# Patient Record
Sex: MALE | Race: WHITE | NOT HISPANIC OR LATINO
[De-identification: names, ages, dates, MRNs, and addresses within clinical notes are randomized per-mention and may not be internally consistent; named-entity substitution may affect disease eponyms.]

---

## 2021-05-16 ENCOUNTER — APPOINTMENT (OUTPATIENT)
Dept: DISASTER EMERGENCY | Facility: CLINIC | Age: 66
End: 2021-05-16

## 2021-05-16 DIAGNOSIS — Z01.818 ENCOUNTER FOR OTHER PREPROCEDURAL EXAMINATION: ICD-10-CM

## 2021-05-17 LAB — SARS-COV-2 N GENE NPH QL NAA+PROBE: NOT DETECTED

## 2021-10-25 ENCOUNTER — APPOINTMENT (OUTPATIENT)
Dept: DISASTER EMERGENCY | Facility: CLINIC | Age: 66
End: 2021-10-25

## 2021-10-26 LAB — SARS-COV-2 N GENE NPH QL NAA+PROBE: NOT DETECTED

## 2022-05-24 ENCOUNTER — APPOINTMENT (OUTPATIENT)
Dept: PAIN MANAGEMENT | Facility: CLINIC | Age: 67
End: 2022-05-24
Payer: COMMERCIAL

## 2022-05-24 VITALS — HEIGHT: 74 IN | WEIGHT: 235 LBS | BODY MASS INDEX: 30.16 KG/M2

## 2022-05-24 PROCEDURE — 99214 OFFICE O/P EST MOD 30 MIN: CPT

## 2022-05-25 NOTE — PHYSICAL EXAM
[de-identified] : Constitutional: \par - No acute distress \par - Well developed; well nourished \par \par Neurological: \par - normal mood and affect \par - alert and oriented x 3  \par \par Cardiovascular: \par - grossly normal\par \par Lumbar Spine Exam: \par \par Inspection: \par erythema (-) \par ecchymosis (-) \par rashes (-) \par alignment: no scoliosis\par \par Palpation:  \par paraspinal tenderness:                  L (-) ; R (-) \par thoracic paraspinal tenderness:    L (-) ; R (-) \par sciatic nerve tenderness :             L (-) ; R (-) \par SI joint tenderness:                        L (-) ; R (-) \par GTB tenderness:                            L (-);  R (-) \par \par ROM:   \par Full ROM with mild stiffness \par \par Strength:                   Right       Left    \par Hip Flexion:                (5/5)       (5/5) \par Quadriceps:               (5/5)       (5/5) \par Hamstrings:                (5/5)       (5/5) \par Ankle Dorsiflexion:     (5/5)       (5/5) \par EHL:                            (5/5)       (5/5) \par Ankle Plantarflexion:  (5/5)       (5/5) \par \par \par Special Tests: \par SLR:                      R (-) ; L (-) \par Facet loading:       R (-) ; L (-) \par DESTINEY test:          R (-) ; L (-) \par XSLR:                   R (-) ; L (-) \par Taj's test:        R (-) ; L(-) \par Tight Hamstrings   R (+) ; L (+) \par \par Neurologic: \par Light touch intact throughout LE \par Reflexes normal and symmetric\par \par Gait: \par non- antalgic gait

## 2022-05-25 NOTE — ASSESSMENT
[FreeTextEntry1] : A thorough discussion occurred regarding available pain management treatment options including interventional, rehabilitative, pharmacological, and alternative modalities with the patient. We will proceed with the following:\par \par Interventional treatment options:\par - Proceed with left L4-L5, L5-S1 TFESI with fluoroscopic guidance - may remain on aspirin \par - requires clearance to hold Brilinta for 5 days \par - see additional instructions below\par \par Rehabilitative options:\par - Remains uninterested in trail of PT \par - Home exercise sheet provided at previous visit \par \par Medication based treatment options:\par - Caution with NSAIDs given use of chronic anti-coagulation \par - Celebrex 200mg Daily PRN; Moderate to severe pain as per PCP \par - continue Tylenol 500-1000 mg TID PRN and flexeril PRN spasm \par - Can consider trial of anti-neuropathic medication with failure of interventional therapy\par - see additional instructions below\par \par Complementary treatment options:\par - Weight management and lifestyle modifications discussed\par - See additional instructions below\par \par Additional treatment recommendations as follows:\par - Previously Discussed orthopedic spine evaluation for spondylolisthesis/stenosis; patient remains uninterested\par - Follow up 1-2 weeks post injection for assessment of efficacy and further recommendations.\par \par The risks, benefits and alternatives of the proposed procedure were explained in detail with the patient. The risks outlined include but are not limited to infection, bleeding, post- dural puncture headache, nerve injury, a temporary increase in pain, failure to resolve symptoms, allergic reaction, and possible elevation of blood sugar in diabetics if using corticosteroid.  All questions were answered to patient's apparent satisfaction and he/she verbalized an understanding.\par \par We have discussed the risks, benefits, and alternatives NSAID therapy including but not limited to the risk of bleeding, thrombosis, gastric mucosal irritation/ulceration, allergic reaction and kidney dysfunction; the patient verbalizes an understanding.\par  \par The documentation recorded by the scribe, in my presence, accurately reflects the service I personally performed and the decisions made by me with my edits as appropriate.\par \par I, Terrell Doran acting as scribe, attest that this documentation has been prepared under the direction and in the presence of Provider Flaco Cerrato DO.

## 2022-05-25 NOTE — HISTORY OF PRESENT ILLNESS
[Lower back] : lower back [Sudden] : sudden [5] : 5 [Dull/Aching] : dull/aching [Intermittent] : intermittent [Leisure] : leisure [Meds] : meds [Injection therapy] : injection therapy [Walking] : walking [Bending forward] : bending forward [FreeTextEntry1] : 5/24/22 - Patient presents for a MRI imaging review. Patient reports increase of pain with prolonged standing. Patient c/o low back pain radiating to the left lower extremity. Patient reports back > LLE pain. Patient reports benefit with previous YUE's. Patient reports he started new employment which requires standing for prolonged period of time. May exacerbate his symptoms. \par \par 2/15/21 - Patient presents for a FUV. Patient c/o soreness in his lower back after increased activity. Does not c/o left leg pain since prior YUE. Patient reports that activity exacerbates his pain. \par \par 11/16/21 - Patient presents for a FUV following a repeat Left L4-5, L5-S1 TFESI on 10/29/21. Patient reports his leg symptoms have improved significantly. Patient reports that his lower back pain is present with activity. Patient denies any bowel/bladder incontinence. \par \par 10/5/21 - Patient presents for a FUV. Patient c/o left leg paresthesia's and intermittent lower back pain with bending. Patient still notices improvement in his pain form his last injection in June but has had a return of left leg pain. \par \par 7/6/21 - FUV after left L4-L5, L5-S1 TFESI on 6/25/21. He reports significant reduction of low back and left leg pain. Some residual numbness and burning in the left thigh, worse with activity. Overall 90% improved. Has stopped use of NSAIDS since injection given improvement of pain. Did not start trial of physical therapy. \par \par 6/8/2021 - FUV after right PM L5-S1 LESI on 5/21/2021. He reports no relief of pain even temporarily. He distributes his pain 50% low back and 50% left leg. Pain is worse with increased activity. \par \par 4/27/2021 - Patient presents for FUV regarding his low back and left leg pain. Was previously indicated for YUE however did not proceed given improvement of symptoms. States his pain has since returned. Pain is predominately across the low back, occasionally radiating down the left leg. Left leg worsened with strenuous activity whereas low back pain is more constant. Alleviated with bending forward. \par \par 7/10/2020 - Patient presents for initial evaluation. He c/o low back and left leg pain. Has had long standing low back pain with recent development of radiating left thigh pain with associated numbness. Pain is worse with standing/walking and activity, alleviated with sitting. Denies LE weakness, no b/b dysfunction. No gait abnormalities. No previous PT/chiropractic/acupuncture. Celebrex PRN and Flexeril PRN spasm. On Brillinta. \par \par Injections: \par 1) Right PM L5-S1 LESI (5/21/2021)\par 2) Left L4-L5, L5-S1 TFESI (6/25/21, 10/29/21)\par \par Pertinent Surgical History: N/A\par \par Imaging:\par MRI Lumbar Spine (03/19/22) - [facility]\par T12-L1: No evidence of disc bulge, protrusion or extrusion. No central canal, subarticular or neural foraminal narrowing is noted. There is no significant facet arthropathy.\par L1-L2: No evidence of disc bulge, protrusion or extrusion. No central canal, subarticular or neural foraminal narrowing is noted. There is no significant facet arthropathy.\par L2-L3: Disc bulge and mild bilateral facet arthrosis. Mild central canal narrowing. No significant change.\par L3-L4: Disc bulge and bilateral facet arthrosis with mild bilateral neural foraminal narrowing. Mild central canal narrowing.\par L4-5: Grade 1 anterolisthesis secondary to severe bilateral facet arthrosis with superimposed circumferential disc bulge with severe central canal narrowing and bilateral subarticular stenosis impinging on the descending L5 nerve roots and potentially additional nerve roots. New Central annular tear new compared to previous examination.\par L5-S1: Protruded Central disc herniation with right greater than left subarticular stenosis with encroachment possible impingement on the right descending S1 nerve root. \par \par Physician Disclaimer: I have personally reviewed and confirmed all HPI data with the patient. [] : no

## 2022-05-25 NOTE — REASON FOR VISIT
[Follow-Up Visit] : a follow-up pain management visit [FreeTextEntry2] : lower back pain and mri review

## 2022-06-02 ENCOUNTER — APPOINTMENT (OUTPATIENT)
Dept: ORTHOPEDIC SURGERY | Facility: CLINIC | Age: 67
End: 2022-06-02
Payer: COMMERCIAL

## 2022-06-02 VITALS — WEIGHT: 235 LBS | HEIGHT: 74 IN | BODY MASS INDEX: 30.16 KG/M2

## 2022-06-02 DIAGNOSIS — Z78.9 OTHER SPECIFIED HEALTH STATUS: ICD-10-CM

## 2022-06-02 DIAGNOSIS — Z86.39 PERSONAL HISTORY OF OTHER ENDOCRINE, NUTRITIONAL AND METABOLIC DISEASE: ICD-10-CM

## 2022-06-02 DIAGNOSIS — Z87.891 PERSONAL HISTORY OF NICOTINE DEPENDENCE: ICD-10-CM

## 2022-06-02 DIAGNOSIS — Z86.79 PERSONAL HISTORY OF OTHER DISEASES OF THE CIRCULATORY SYSTEM: ICD-10-CM

## 2022-06-02 PROCEDURE — 73110 X-RAY EXAM OF WRIST: CPT | Mod: LT

## 2022-06-02 PROCEDURE — 20605 DRAIN/INJ JOINT/BURSA W/O US: CPT

## 2022-06-02 PROCEDURE — 99203 OFFICE O/P NEW LOW 30 MIN: CPT | Mod: 25

## 2022-06-02 NOTE — PHYSICAL EXAM
[1st] : 1st [CMC Joint] : CMC joint [Right] : right elbow [Left] : left wrist [FreeTextEntry8] : stage 2 First CMC arthritis

## 2022-06-02 NOTE — PROCEDURE
[Medium Joint Injection] : medium joint injection [Left] : of the left [CMC Joint] : CMC joint [Pain] : pain [Inflammation] : inflammation [Alcohol] : alcohol [Ethyl Chloride sprayed topically] : ethyl chloride sprayed topically [Sterile technique used] : sterile technique used

## 2022-06-02 NOTE — HISTORY OF PRESENT ILLNESS
[Sudden] : sudden [9] : 9 [Radiating] : radiating [Sharp] : sharp [Household chores] : household chores [Leisure] : leisure [Work] : work [Sleep] : sleep [Meds] : meds [Full time] : Work status: full time [de-identified] : 66 year old male presents with radial and volar left hand and wrist pain for the past 3 weeks. No injury/trauma.  [] : no [FreeTextEntry1] : left hand [FreeTextEntry3] : 05/2022 [FreeTextEntry5] : No known cause of injury/ trauma  [FreeTextEntry9] : A [de-identified] : Pressure  [de-identified] : [place]

## 2022-06-11 ENCOUNTER — NON-APPOINTMENT (OUTPATIENT)
Age: 67
End: 2022-06-11

## 2022-06-15 ENCOUNTER — APPOINTMENT (OUTPATIENT)
Dept: PAIN MANAGEMENT | Facility: CLINIC | Age: 67
End: 2022-06-15
Payer: COMMERCIAL

## 2022-06-15 PROCEDURE — 64483 NJX AA&/STRD TFRM EPI L/S 1: CPT | Mod: LT

## 2022-06-15 PROCEDURE — 64484 NJX AA&/STRD TFRM EPI L/S EA: CPT | Mod: LT,59

## 2022-06-15 NOTE — PROCEDURE
[FreeTextEntry3] : Date of Service: 06/15/2022 \par \par Account: [id number]\par \par Patient: EMMANUEL GONZALEZ \par \par YOB: 1955\par \par Age: 66 year\par \par Surgeon:   Flaco Cerrato DO\par \par Assistant:  None\par \par Pre-Operative Diagnosis:   Lumbosacral Radiculitis (M54.17)\par \par Post Operative Diagnosis: Lumbosacral Radiculitis (M54.17)\par \par Procedure:             Left L4-L5, L5-S1 transforaminal epidural steroid injection under fluoroscopic guidance.\par \par Anesthesia:            MAC\par \par This procedure was carried out using fluoroscopic guidance.  The risks and benefits of the procedure were discussed extensively with the patient.  The consent of the patient was obtained and the following procedure was performed. The patient was placed in the prone position on the fluoroscopy table and the area was prepped and draped in a sterile fashion.  A timeout was performed with all essential staff present and the site and side were verified.\par \par The left L4-L5 neural foramen was then identified on right oblique "devon dog" anatomical view at the 6 o' clock position using fluoroscopic guidance, and the area was marked. The overlying skin and subcutaneous structures were anesthetized using sterile technique with 1% Lidocaine.   A 22 gauge 5 inch spinal needle was directed toward the inferior (6 o'clock) position of the pedicle, which formed the roof of the identified foramen.  Once in the epidural space, after negative aspiration for heme and CSF, 1cc of Omnipaque contrast was injected to confirm epidural location and assess filling defects and rule out intravascular needle placement.\par \par Lumbar epidurogram showed no intravascular or intrathecal flow pattern.  No blood or CSF was aspirated.  Omnipaque spread medially in epidural space and outlined the exiting nerve root.\par \par After this, 2.5 cc of a mixture of 3 cc of preservative free normal saline plus 80 mg of Kenalog was injected in the epidural space\par \par The left L5-S1 neural foramen and was then identified on right oblique "devon dog" anatomical view at the 6 o' clock position using fluoroscopic guidance, and the area was marked.  The overlying skin and subcutaneous structures were anesthetized using sterile technique with 1% Lidocaine.   A 22 gauge 5 inch spinal needle was directed toward the inferior (6 o'clock) position of the pedicle, which formed the roof of the identified foramen.  Once in the epidural space, after negative aspiration for heme and CSF, 1cc of Omnipaque contrast was injected to confirm epidural location and assess filling defects and rule out intravascular needle placement. \par \par Lumbar epidurogram showed no intravascular or intrathecal flow pattern.  No blood or CSF was aspirated.  Omnipaque spread medially in epidural space and outlined the exiting nerve root. \par \par After this, the remainder of the injectate listed above was injected in the epidural space.\par \par The needle was subsequently removed.  Vital signs remained normal.  Pulse oximeter was used throughout the procedure and the patient's pulse and oxygen saturation remained within normal limits.  The patient tolerated the procedure well.  There were no complications.  The patient was instructed to apply ice over the injection sites for twenty minutes every two hours for the next 24 to 48 hours.\par \par Disposition:\par      1. The patient was advised to F/U in 1-2 weeks to assess the response to the injection.\par      2. The patient was also instructed to contact me immediately if there were any concerns related to the procedure performed.

## 2022-07-05 ENCOUNTER — APPOINTMENT (OUTPATIENT)
Dept: PAIN MANAGEMENT | Facility: CLINIC | Age: 67
End: 2022-07-05

## 2022-07-05 VITALS — HEIGHT: 74 IN | WEIGHT: 235 LBS | BODY MASS INDEX: 30.16 KG/M2

## 2022-07-05 DIAGNOSIS — M54.16 RADICULOPATHY, LUMBAR REGION: ICD-10-CM

## 2022-07-05 DIAGNOSIS — M47.816 SPONDYLOSIS W/OUT MYELOPATHY OR RADICULOPATHY, LUMBAR REGION: ICD-10-CM

## 2022-07-05 DIAGNOSIS — M48.061 SPINAL STENOSIS, LUMBAR REGION WITHOUT NEUROGENIC CLAUDICATION: ICD-10-CM

## 2022-07-05 PROCEDURE — 99214 OFFICE O/P EST MOD 30 MIN: CPT

## 2022-07-06 NOTE — PHYSICAL EXAM
[de-identified] : Constitutional: \par - No acute distress \par - Well developed; well nourished \par \par Neurological: \par - normal mood and affect \par - alert and oriented x 3  \par \par Cardiovascular: \par - grossly normal\par \par Lumbar Spine Exam: \par \par Inspection: \par erythema (-) \par ecchymosis (-) \par rashes (-) \par alignment: no scoliosis\par \par Palpation:  \par paraspinal tenderness:                  L (-) ; R (-) \par thoracic paraspinal tenderness:    L (-) ; R (-) \par sciatic nerve tenderness :             L (-) ; R (-) \par SI joint tenderness:                        L (-) ; R (-) \par GTB tenderness:                            L (-);  R (-) \par \par ROM:   \par Full ROM with mild stiffness \par \par Strength:                   Right       Left    \par Hip Flexion:                (5/5)       (5/5) \par Quadriceps:               (5/5)       (5/5) \par Hamstrings:                (5/5)       (5/5) \par Ankle Dorsiflexion:     (5/5)       (5/5) \par EHL:                            (5/5)       (5/5) \par Ankle Plantarflexion:  (5/5)       (5/5)\par \par Special Tests: \par SLR:                      R (-) ; L (-) \par Facet loading:       R (-) ; L (-) \par DESTINEY test:          R (-) ; L (-)\par Tight Hamstrings   R (+) ; L (+) \par \par Neurologic: \par Light touch intact throughout LE \par Reflexes normal and symmetric\par \par Gait: \par mildly antalgic gait

## 2022-07-06 NOTE — HISTORY OF PRESENT ILLNESS
[Lower back] : lower back [5] : 5 [Dull/Aching] : dull/aching [Intermittent] : intermittent [Household chores] : household chores [Leisure] : leisure [Social interactions] : social interactions [Meds] : meds [Walking] : walking [Bending forward] : bending forward [Lying in bed] : lying in bed [FreeTextEntry1] : 7/5/22 - Patient presents for a FUV following a Left L4-5, L5-S1 TFESI on 06/15/2021. Patient reports a significant reduction in his lower extremity pain. Patient reports an overall 70% improvement in his pain. \par \par 5/24/22 - Patient presents for a MRI imaging review. Patient reports increase of pain with prolonged standing. Patient c/o low back pain radiating to the left lower extremity. Patient reports back > LLE pain. Patient reports benefit with previous UYE's. Patient reports he started new employment which requires standing for prolonged period of time. May exacerbate his symptoms. \par \par 2/15/21 - Patient presents for a FUV. Patient c/o soreness in his lower back after increased activity. Does not c/o left leg pain since prior YUE. Patient reports that activity exacerbates his pain. \par \par 11/16/21 - Patient presents for a FUV following a repeat Left L4-5, L5-S1 TFESI on 10/29/21. Patient reports his leg symptoms have improved significantly. Patient reports that his lower back pain is present with activity. Patient denies any bowel/bladder incontinence. \par \par 10/5/21 - Patient presents for a FUV. Patient c/o left leg paresthesia's and intermittent lower back pain with bending. Patient still notices improvement in his pain form his last injection in June but has had a return of left leg pain. \par \par 7/6/21 - FUV after left L4-L5, L5-S1 TFESI on 6/25/21. He reports significant reduction of low back and left leg pain. Some residual numbness and burning in the left thigh, worse with activity. Overall 90% improved. Has stopped use of NSAIDS since injection given improvement of pain. Did not start trial of physical therapy. \par \par 6/8/2021 - FUV after right PM L5-S1 LESI on 5/21/2021. He reports no relief of pain even temporarily. He distributes his pain 50% low back and 50% left leg. Pain is worse with increased activity. \par \par 4/27/2021 - Patient presents for FUV regarding his low back and left leg pain. Was previously indicated for YUE however did not proceed given improvement of symptoms. States his pain has since returned. Pain is predominately across the low back, occasionally radiating down the left leg. Left leg worsened with strenuous activity whereas low back pain is more constant. Alleviated with bending forward. \par \par 7/10/2020 - Patient presents for initial evaluation. He c/o low back and left leg pain. Has had long standing low back pain with recent development of radiating left thigh pain with associated numbness. Pain is worse with standing/walking and activity, alleviated with sitting. Denies LE weakness, no b/b dysfunction. No gait abnormalities. No previous PT/chiropractic/acupuncture. Celebrex PRN and Flexeril PRN spasm. On Brillinta. \par \par Injections: \par 1) Right PM L5-S1 LESI (5/21/2021)\par 2) Left L4-L5, L5-S1 TFESI (6/25/21, 10/29/21)\par \par Pertinent Surgical History: N/A\par \par Imaging:\par MRI Lumbar Spine (03/19/22) - [facility]\par \par T12-L1: No evidence of disc bulge, protrusion or extrusion. No central canal, subarticular or neural foraminal narrowing is noted. There is no significant facet arthropathy.\par L1-L2: No evidence of disc bulge, protrusion or extrusion. No central canal, subarticular or neural foraminal narrowing is noted. There is no significant facet arthropathy.\par L2-L3: Disc bulge and mild bilateral facet arthrosis. Mild central canal narrowing. No significant change.\par L3-L4: Disc bulge and bilateral facet arthrosis with mild bilateral neural foraminal narrowing. Mild central canal narrowing.\par L4-5: Grade 1 anterolisthesis secondary to severe bilateral facet arthrosis with superimposed circumferential disc bulge with severe central canal narrowing and bilateral subarticular stenosis impinging on the descending L5 nerve roots and potentially additional nerve roots. New Central annular tear new compared to previous examination.\par L5-S1: Protruded Central disc herniation with right greater than left subarticular stenosis with encroachment possible impingement on the right descending S1 nerve root. \par \par Physician Disclaimer: I have personally reviewed and confirmed all HPI data with the patient.  [] : no

## 2022-07-06 NOTE — ASSESSMENT
[FreeTextEntry1] : A thorough discussion occurred regarding available pain management treatment options including interventional, rehabilitative, pharmacological, and alternative modalities with the patient. We will proceed with the following:\par \par Interventional treatment options:\par - Can repeat left L4-L5, L5-S1 TFESI with return of severe radicular pain; may remain on aspirin\par - requires clearance to hold Brilinta for 5 days \par - see additional instructions below\par \par Rehabilitative options:\par - Remains uninterested in trial of PT \par - Home exercise sheet provided at previous visit \par \par Medication based treatment options:\par - Caution with NSAIDs given use of chronic anti-coagulation \par - Celebrex 200mg Daily PRN; Moderate to severe pain as per PCP \par - Continue Tylenol 500-1000 mg TID PRN \par - Can consider trial of anti-neuropathic medication with failure of interventional therapy\par - see additional instructions below\par \par Complementary treatment options:\par - Weight management and lifestyle modifications discussed\par \par Additional treatment recommendations as follows:\par - Previously discussed orthopedic spine evaluation for spondylolisthesis/stenosis; patient remains uninterested\par - Follow up for repeat injection or PRN basis \par \par The risks, benefits and alternatives of the proposed procedure were explained in detail with the patient. The risks outlined include but are not limited to infection, bleeding, post- dural puncture headache, nerve injury, a temporary increase in pain, failure to resolve symptoms, allergic reaction, and possible elevation of blood sugar in diabetics if using corticosteroid. All questions were answered to patient's apparent satisfaction and he/she verbalized an understanding.\par \par We have discussed the risks, benefits, and alternatives NSAID therapy including but not limited to the risk of bleeding, thrombosis, gastric mucosal irritation/ulceration, allergic reaction and kidney dysfunction; the patient verbalizes an understanding.\par  \par The documentation recorded by the scribe, in my presence, accurately reflects the service I personally performed and the decisions made by me with my edits as appropriate.

## 2022-09-12 ENCOUNTER — APPOINTMENT (OUTPATIENT)
Dept: ORTHOPEDIC SURGERY | Facility: CLINIC | Age: 67
End: 2022-09-12

## 2022-09-12 VITALS — HEIGHT: 74 IN | WEIGHT: 220 LBS | BODY MASS INDEX: 28.23 KG/M2

## 2022-09-12 PROCEDURE — 99213 OFFICE O/P EST LOW 20 MIN: CPT | Mod: 25,25

## 2022-09-12 PROCEDURE — 20605 DRAIN/INJ JOINT/BURSA W/O US: CPT

## 2022-09-12 NOTE — HISTORY OF PRESENT ILLNESS
[7] : 7 [0] : 0 [Full time] : Work status: full time [de-identified] : 66 year old male followed for LEFT first CMC arthritis. 3 months s/p CSI. Having reoccurrence.  [] : no

## 2023-10-06 ENCOUNTER — APPOINTMENT (OUTPATIENT)
Dept: ORTHOPEDIC SURGERY | Facility: CLINIC | Age: 68
End: 2023-10-06
Payer: MEDICARE

## 2023-10-06 VITALS — HEIGHT: 74 IN | WEIGHT: 220 LBS | BODY MASS INDEX: 28.23 KG/M2

## 2023-10-06 DIAGNOSIS — Z00.00 ENCOUNTER FOR GENERAL ADULT MEDICAL EXAMINATION W/OUT ABNORMAL FINDINGS: ICD-10-CM

## 2023-10-06 DIAGNOSIS — M17.11 UNILATERAL PRIMARY OSTEOARTHRITIS, RIGHT KNEE: ICD-10-CM

## 2023-10-06 DIAGNOSIS — M19.011 PRIMARY OSTEOARTHRITIS, RIGHT SHOULDER: ICD-10-CM

## 2023-10-06 DIAGNOSIS — M70.41 PREPATELLAR BURSITIS, RIGHT KNEE: ICD-10-CM

## 2023-10-06 DIAGNOSIS — M17.12 UNILATERAL PRIMARY OSTEOARTHRITIS, LEFT KNEE: ICD-10-CM

## 2023-10-06 DIAGNOSIS — Z78.9 OTHER SPECIFIED HEALTH STATUS: ICD-10-CM

## 2023-10-06 PROCEDURE — 99213 OFFICE O/P EST LOW 20 MIN: CPT | Mod: 25

## 2023-10-06 PROCEDURE — 20611 DRAIN/INJ JOINT/BURSA W/US: CPT | Mod: 50

## 2023-10-06 PROCEDURE — 73564 X-RAY EXAM KNEE 4 OR MORE: CPT | Mod: 50

## 2023-10-06 PROCEDURE — 73030 X-RAY EXAM OF SHOULDER: CPT | Mod: RT

## 2023-10-06 RX ORDER — LOSARTAN POTASSIUM 100 MG/1
100 TABLET, FILM COATED ORAL
Refills: 0 | Status: ACTIVE | COMMUNITY

## 2023-10-06 RX ORDER — EVOLOCUMAB 420 MG/3.5
420 KIT SUBCUTANEOUS
Refills: 0 | Status: ACTIVE | COMMUNITY

## 2023-10-06 RX ORDER — TICAGRELOR 60 MG/1
60 TABLET ORAL
Refills: 0 | Status: ACTIVE | COMMUNITY

## 2023-10-06 RX ORDER — OMEPRAZOLE 20 MG/1
20 TABLET, DELAYED RELEASE ORAL
Refills: 0 | Status: ACTIVE | COMMUNITY

## 2023-10-06 RX ORDER — SIMVASTATIN 20 MG/1
20 TABLET, FILM COATED ORAL
Refills: 0 | Status: ACTIVE | COMMUNITY

## 2023-10-06 RX ORDER — NEBIVOLOL HYDROCHLORIDE 5 MG/1
5 TABLET ORAL
Refills: 0 | Status: ACTIVE | COMMUNITY

## 2023-10-06 RX ORDER — AMLODIPINE BESYLATE 5 MG/1
5 TABLET ORAL
Refills: 0 | Status: ACTIVE | COMMUNITY

## 2023-10-06 RX ORDER — CELECOXIB 200 MG/1
200 CAPSULE ORAL
Refills: 0 | Status: ACTIVE | COMMUNITY

## 2023-10-16 ENCOUNTER — APPOINTMENT (OUTPATIENT)
Dept: ORTHOPEDIC SURGERY | Facility: CLINIC | Age: 68
End: 2023-10-16
Payer: MEDICARE

## 2023-10-16 VITALS — BODY MASS INDEX: 28.23 KG/M2 | WEIGHT: 220 LBS | HEIGHT: 74 IN

## 2023-10-16 DIAGNOSIS — M18.12 UNILATERAL PRIMARY OSTEOARTHRITIS OF FIRST CARPOMETACARPAL JOINT, LEFT HAND: ICD-10-CM

## 2023-10-16 PROCEDURE — 20605 DRAIN/INJ JOINT/BURSA W/O US: CPT | Mod: LT

## 2023-10-16 PROCEDURE — 99213 OFFICE O/P EST LOW 20 MIN: CPT | Mod: 25

## 2024-05-29 ENCOUNTER — APPOINTMENT (OUTPATIENT)
Dept: ORTHOPEDIC SURGERY | Facility: CLINIC | Age: 69
End: 2024-05-29
Payer: MEDICARE

## 2024-05-29 VITALS — WEIGHT: 220 LBS | BODY MASS INDEX: 28.23 KG/M2 | HEIGHT: 74 IN

## 2024-05-29 DIAGNOSIS — S93.422A SPRAIN OF DELTOID LIGAMENT OF LEFT ANKLE, INITIAL ENCOUNTER: ICD-10-CM

## 2024-05-29 DIAGNOSIS — M72.2 PLANTAR FASCIAL FIBROMATOSIS: ICD-10-CM

## 2024-05-29 DIAGNOSIS — M76.822 POSTERIOR TIBIAL TENDINITIS, LEFT LEG: ICD-10-CM

## 2024-05-29 PROCEDURE — 99214 OFFICE O/P EST MOD 30 MIN: CPT

## 2024-05-29 NOTE — ASSESSMENT
[FreeTextEntry1] : 67 yo male presenting with left posterior tibial tendon dysfunction, plantar fasciitis, deltoid sprain. Recommend non-surgical management. -Discussed with patient that right plantar fascial CSI not recommend due to increased risk of calcaneal stress fractures due to thinning of calcaneal fat pad. Patient understands. -Rx plantar fasciitis night time splint given -Demonstrated plantar fascia stretching/strengthening exercises with patient -Rx PT given today -Recommend rigid OTC orthotics -Discussed with patient that this condition is progressive, may require flat foot reconstruction surgical intervention in the future, patient understands -Activities as tolerated -Discussed risks and benefits of plantar fascial PRP injections. Discussed with patient that if conservative management fails that patient may be indicated for PRP. -F/u 6 weeks  The diagnosis was explained in detail. The potential non-surgical and surgical treatments were reviewed. The relative risks and benefits of each option were considered relative to the patients age, activity level, medical history, symptom severity and previously attempted treatments.  The patient was advised to consult with their primary medical provider prior to initiation of any new medications to reduce the risk of adverse effects specific to their long-term home medications and medical history. The risk of gastrointestinal irritation and kidney injury specific to long-term NSAID use was discussed.  Entered by Carlos Goode PA-C acting as scribe. Dr. Rush Attestation The documentation recorded by the scribe, in my presence, accurately reflects the service I, Dr. Rush, personally performed, and the decisions made by me with my edits as appropriate.

## 2024-05-29 NOTE — PHYSICAL EXAM
[de-identified] : Examination of the left foot and ankle is as follows:  INSPECTION: swelling, gross deformity, hindfoot valgus, mild swelling of medial ankle, but no abrasion, no laceration, no erythema, no ecchymosis  PALPATION: posterior tibialis tendon tenderness, deltoid ligament tenderness, plantar fascial tenderness, but no calf tenderness, no medial malleolus tenderness, no lateral ligament tenderness, no deltoid ligament tenderness, no achilles tendon tenderness  ROM: dorsiflexion 20 degrees, plantar flexion 40 degrees, inversion 30 degrees, eversion 20 degrees  STRENGTH: dorsiflexion 5/5, plantarflexion 5/5. Inversion 5/5, eversion, 5/5, EHL 5/5, FHL 5/5  TESTING: heel moves into varus with standing heel raises, negative anterior drawer at ankle, negative Zhu test, able to perform single heel raise  VASCULAR: dorsalis pedis pulse 2+, posterior tibialis pulse 2+  NEURO: deep peroneal nerve sensation normal, lateral plantar nerve sensation normal, medial plantar nerve sensation normal, superficial peroneal nerve sensation normal, sural nerve sensation normal, saphenous nerve sensation normal, sensation present to light touch in all distributions  GAIT: mildly antalgic, patient ambulates without assistive device  X-rays of the left ankle is as follows: outside x-rays reviewed from [facility] Ankle 3+ View: Pes planus alignment, mild djd in TN and ST joints. There are no fractures, subluxations or dislocations.

## 2024-05-29 NOTE — HISTORY OF PRESENT ILLNESS
[Gradual] : gradual [Dull/Aching] : dull/aching [Rest] : rest [Meds] : meds [Sudden] : sudden [Full time] : Work status: full time [de-identified] : Patient is here today for his left ankle. Pain began on 4/14/24. Patient states that he was moving a table off his deck when he fell onto the table and twisted his left ankle. Patient noting constant stabbing pain over medial ankle and stabbing pain in his heel. Patient taking Tylenol and Alleve PRN for pain with no relief. Patient states that he tried wearing an ankle brace with no relief of pain. Patient had x-rays at [facility] on 5/1/24 which was ordered by PCP Dr. Moraes. [Sharp] : sharp [Constant] : constant [] : Post Surgical Visit: no [FreeTextEntry1] : Left ankle [FreeTextEntry3] : 4/14/24 [FreeTextEntry5] : Patient states that he was moving a table off his deck when he fell onto the table and twisted his left ankle. [FreeTextEntry9] : elevation [de-identified] : movement [de-identified] : X-rays at [facility] on  5/1/24 [de-identified] :

## 2024-06-07 RX ORDER — MELOXICAM 15 MG/1
15 TABLET ORAL DAILY
Qty: 14 | Refills: 0 | Status: ACTIVE | COMMUNITY
Start: 2024-06-07 | End: 1900-01-01

## 2024-07-15 ENCOUNTER — APPOINTMENT (OUTPATIENT)
Dept: PAIN MANAGEMENT | Facility: CLINIC | Age: 69
End: 2024-07-15
Payer: MEDICARE

## 2024-07-15 VITALS — HEIGHT: 74 IN | BODY MASS INDEX: 28.75 KG/M2 | WEIGHT: 224 LBS

## 2024-07-15 DIAGNOSIS — M48.061 SPINAL STENOSIS, LUMBAR REGION WITHOUT NEUROGENIC CLAUDICATION: ICD-10-CM

## 2024-07-15 DIAGNOSIS — M54.16 RADICULOPATHY, LUMBAR REGION: ICD-10-CM

## 2024-07-15 DIAGNOSIS — M47.816 SPONDYLOSIS W/OUT MYELOPATHY OR RADICULOPATHY, LUMBAR REGION: ICD-10-CM

## 2024-07-15 PROCEDURE — 99204 OFFICE O/P NEW MOD 45 MIN: CPT

## 2024-07-15 PROCEDURE — 99214 OFFICE O/P EST MOD 30 MIN: CPT

## 2024-07-17 ENCOUNTER — APPOINTMENT (OUTPATIENT)
Dept: ORTHOPEDIC SURGERY | Facility: CLINIC | Age: 69
End: 2024-07-17
Payer: MEDICARE

## 2024-07-17 DIAGNOSIS — M76.822 POSTERIOR TIBIAL TENDINITIS, LEFT LEG: ICD-10-CM

## 2024-07-17 PROCEDURE — 99214 OFFICE O/P EST MOD 30 MIN: CPT

## 2024-08-14 ENCOUNTER — APPOINTMENT (OUTPATIENT)
Dept: PAIN MANAGEMENT | Facility: CLINIC | Age: 69
End: 2024-08-14
Payer: MEDICARE

## 2024-08-14 DIAGNOSIS — M54.16 RADICULOPATHY, LUMBAR REGION: ICD-10-CM

## 2024-08-14 PROCEDURE — 62323 NJX INTERLAMINAR LMBR/SAC: CPT

## 2024-08-14 NOTE — PROCEDURE
[FreeTextEntry3] : Date of Service: 08/14/2024   Account: [id number]  Patient: EMMANUEL GONZALEZ   YOB: 1955  Age: 68 year  Surgeon:      Flaco Cerrato DO  Assistant:    None  Pre-Operative Diagnosis:         Lumbosacral Radiculitis (M54.17)  Post Operative Diagnosis:       Lumbosacral Radiculitis (M54.17)     Procedure:             Lumbar interlaminar (L5-S1) epidural steroid injection under fluoroscopic guidance  Anesthesia:            MAC  This procedure was carried out using fluoroscopic guidance.  The risks and benefits of the procedure were discussed extensively with the patient.  The consent of the patient was obtained and the following procedure was performed.  A timeout was performed with all essential staff present and the site and side were verified.  The patient was placed in the prone position with a pillow under the abdomen to minimize the lumbar lordosis.  The lumbar area was prepped and draped in a sterile fashion.  Under A/P view with slight cephalad-caudad angulation, the L5-S1 interspace was identified and marked.  Using sterile technique the superficial skin was anesthetized with 1% Lidocaine.  A 20-gauge Tuohy needle was advanced into the epidural space under fluoroscopy using loss of resistance at the L5-S1 level.  After negative aspiration for heme or CSF, an epidurogram was obtained in the A/P and lateral fluoroscopic views using 2-3 cc of Omnipaque contrast confirming epidural placement of the needle.  After this, 5 cc of a mixture of preservative free normal saline and 80 mg of Kenalog were injected into the epidural space.   Vital signs remained normal throughout the procedure.  The patient tolerated the procedure well.  There were no immediate complications from the performed procedure.  The patient was instructed to apply ice over the injection sites for twenty minutes every two hours for the next 24 hours.  Disposition:      1. The patient was advised to F/U in 1-2 weeks to assess the response to the injection.      2. The patient was also instructed to contact me immediately if there were any concerns related to the procedure performed.

## 2024-09-09 ENCOUNTER — APPOINTMENT (OUTPATIENT)
Dept: PAIN MANAGEMENT | Facility: CLINIC | Age: 69
End: 2024-09-09
Payer: MEDICARE

## 2024-09-09 VITALS — WEIGHT: 222 LBS | BODY MASS INDEX: 28.49 KG/M2 | HEIGHT: 74 IN

## 2024-09-09 DIAGNOSIS — M48.061 SPINAL STENOSIS, LUMBAR REGION WITHOUT NEUROGENIC CLAUDICATION: ICD-10-CM

## 2024-09-09 DIAGNOSIS — M54.16 RADICULOPATHY, LUMBAR REGION: ICD-10-CM

## 2024-09-09 DIAGNOSIS — M47.816 SPONDYLOSIS W/OUT MYELOPATHY OR RADICULOPATHY, LUMBAR REGION: ICD-10-CM

## 2024-09-09 PROCEDURE — 99214 OFFICE O/P EST MOD 30 MIN: CPT

## 2024-09-09 RX ORDER — EVOLOCUMAB 140 MG/ML
140 INJECTION, SOLUTION SUBCUTANEOUS
Refills: 0 | Status: ACTIVE | COMMUNITY

## 2024-09-09 NOTE — PHYSICAL EXAM
[de-identified] : Constitutional:  - No acute distress  - Well developed; well nourished   Neurological:  - normal mood and affect  - alert and oriented x 3    Cardiovascular:  - grossly normal  Lumbar Spine Exam:   Inspection:  erythema (-)  ecchymosis (-)  rashes (-)  alignment: no scoliosis  Palpation:   paraspinal tenderness:                  L (-) ; R (-)  thoracic paraspinal tenderness:    L (-) ; R (-)  sciatic nerve tenderness :             L (-) ; R (-)  SI joint tenderness:                        L (-) ; R (-)  GTB tenderness:                            L (-);  R (-)   ROM: Full ROM with mild stiffness  Pain with extremes of extension  Strength:                   Right       Left     Hip Flexion:                (5/5)       (5/5)  Quadriceps:               (5/5)       (5/5)  Hamstrings:                (5/5)       (5/5)  Ankle Dorsiflexion:     (5/5)       (5/5)  EHL:                            (5/5)       (5/5)  Ankle Plantarflexion:  (5/5)       (5/5)  Special Tests:  SLR:                      R (=) ; L (=)  Facet loading:       R (+) ; L (+)  DESTINEY test:          R (n/a) ; L (n/a) Tight Hamstrings   R (+) ; L (+)   Neurologic:  Light touch intact throughout LE  Reflexes normal and symmetric  Gait:  mildly antalgic gait

## 2024-09-09 NOTE — PHYSICAL EXAM
[de-identified] : Constitutional:  - No acute distress  - Well developed; well nourished   Neurological:  - normal mood and affect  - alert and oriented x 3    Cardiovascular:  - grossly normal  Lumbar Spine Exam:   Inspection:  erythema (-)  ecchymosis (-)  rashes (-)  alignment: no scoliosis  Palpation:   paraspinal tenderness:                  L (-) ; R (-)  thoracic paraspinal tenderness:    L (-) ; R (-)  sciatic nerve tenderness :             L (-) ; R (-)  SI joint tenderness:                        L (-) ; R (-)  GTB tenderness:                            L (-);  R (-)   ROM: Full ROM with mild stiffness  Pain with extremes of extension  Strength:                   Right       Left     Hip Flexion:                (5/5)       (5/5)  Quadriceps:               (5/5)       (5/5)  Hamstrings:                (5/5)       (5/5)  Ankle Dorsiflexion:     (5/5)       (5/5)  EHL:                            (5/5)       (5/5)  Ankle Plantarflexion:  (5/5)       (5/5)  Special Tests:  SLR:                      R (=) ; L (=)  Facet loading:       R (+) ; L (+)  DESTINEY test:          R (n/a) ; L (n/a) Tight Hamstrings   R (+) ; L (+)   Neurologic:  Light touch intact throughout LE  Reflexes normal and symmetric  Gait:  mildly antalgic gait

## 2024-09-09 NOTE — HISTORY OF PRESENT ILLNESS
[Lower back] : lower back [5] : 5 [Dull/Aching] : dull/aching [Intermittent] : intermittent [Household chores] : household chores [Leisure] : leisure [Social interactions] : social interactions [Meds] : meds [Walking] : walking [Bending forward] : bending forward [Lying in bed] : lying in bed [FreeTextEntry1] : 2024 - Patient presents for FUV after a L5-S1 LESI on 24. He reports minimal change following his lumbar YUE.  Patient reports predominantly focal left sided axial low back pain with minimal radiation to the upper buttocks.  He denies any radiation to lower extremity per se.  Pain typically worse while laying in bed and rising from seated position.  Continues to use Tylenol and Celebrex on as-needed basis.  7/15/2024 - Patient presents for reevaluation.  He was last seen approximately 2 years ago.  Patient reports overall his symptoms have been largely under control since he was last seen.  Over the course of the past few months however he has noticed an increase in his low back pain.  There is a occasional sensation of "heaviness" in the lower extremities.  He denies any lower extremity weakness, bladder/bowel dysfunction, or saddle numbness.  22 - Patient presents for a FUV following a Left L4-5, L5-S1 TFESI on 06/15/2021. Patient reports a significant reduction in his lower extremity pain. Patient reports an overall 70% improvement in his pain.   22 - Patient presents for a MRI imaging review. Patient reports increase of pain with prolonged standing. Patient c/o low back pain radiating to the left lower extremity. Patient reports back > LLE pain. Patient reports benefit with previous YUE's. Patient reports he started new employment which requires standing for prolonged period of time. May exacerbate his symptoms.   2/15/21 - Patient presents for a FUV. Patient c/o soreness in his lower back after increased activity. Does not c/o left leg pain since prior YUE. Patient reports that activity exacerbates his pain.   21 - Patient presents for a FUV following a repeat Left L4-5, L5-S1 TFESI on 10/29/21. Patient reports his leg symptoms have improved significantly. Patient reports that his lower back pain is present with activity. Patient denies any bowel/bladder incontinence.   10/5/21 - Patient presents for a FUV. Patient c/o left leg paresthesia's and intermittent lower back pain with bending. Patient still notices improvement in his pain form his last injection in  but has had a return of left leg pain.   21 - FUV after left L4-L5, L5-S1 TFESI on 21. He reports significant reduction of low back and left leg pain. Some residual numbness and burning in the left thigh, worse with activity. Overall 90% improved. Has stopped use of NSAIDS since injection given improvement of pain. Did not start trial of physical therapy.   2021 - FUV after right PM L5-S1 LESI on 2021. He reports no relief of pain even temporarily. He distributes his pain 50% low back and 50% left leg. Pain is worse with increased activity.   2021 - Patient presents for FUV regarding his low back and left leg pain. Was previously indicated for YUE however did not proceed given improvement of symptoms. States his pain has since returned. Pain is predominately across the low back, occasionally radiating down the left leg. Left leg worsened with strenuous activity whereas low back pain is more constant. Alleviated with bending forward.   7/10/2020 - Patient presents for initial evaluation. He c/o low back and left leg pain. Has had long standing low back pain with recent development of radiating left thigh pain with associated numbness. Pain is worse with standing/walking and activity, alleviated with sitting. Denies LE weakness, no b/b dysfunction. No gait abnormalities. No previous PT/chiropractic/acupuncture. Celebrex PRN and Flexeril PRN spasm. On Brillinta.   Injections:  1) L5-S1 LESI (2021, 2024) 2) Left L4-L5, L5-S1 TFESI (21, 10/29/21)  Pertinent Surgical History: N/A  Imagin) MRI Lumbar Spine (22) - [facility]  L1-L2: No evidence of disc bulge, protrusion or extrusion. No central canal, subarticular or neural foraminal narrowing is noted. There is no significant facet arthropathy. L2-L3: Disc bulge and mild bilateral facet arthrosis. Mild central canal narrowing. No significant change. L3-L4: Disc bulge and bilateral facet arthrosis with mild bilateral neural foraminal narrowing. Mild central canal narrowing. L4-5: Grade 1 anterolisthesis secondary to severe bilateral facet arthrosis with superimposed circumferential disc bulge with severe central canal narrowing and bilateral subarticular stenosis impinging on the descending L5 nerve roots and potentially additional nerve roots. New Central annular tear new compared to previous examination. L5-S1: Protruded Central disc herniation with right greater than left subarticular stenosis with encroachment possible impingement on the right descending S1 nerve root.   Physician Disclaimer: I have personally reviewed and confirmed all HPI data with the patient.  [] : no

## 2024-09-09 NOTE — ASSESSMENT
[FreeTextEntry1] : A thorough discussion occurred regarding available pain management treatment options including interventional, rehabilitative, pharmacological, and alternative modalities with the patient. We will proceed with the following:  Interventional treatment options: - None indicated present time - Proceed with L5-S1 LESI (80 mg Kenalog) with fluoroscopic guidance - Would likely proceed with lumbar facet direct intervention for ongoing axial low back pain; informational materials provided - Patient requires cardiology clearance to hold Brilinta x 5 days; may remain on aspirin if applicable - see additional instructions below  Rehabilitative options: - Remains uninterested in trial of PT - Active participation in HEP as tolerated - Home exercise sheet provided at previous visit   Medication based treatment options: - Caution with NSAIDs given use of chronic anti-coagulation  - Can continue Celebrex 200mg daily on as-needed basis as per PCP - Continue Tylenol 500-1000 mg up to TID PRN  - Can consider trial of anti-neuropathic medication with failure of interventional therapy - see additional instructions below  Complementary treatment options: - Weight management and lifestyle modifications discussed  Additional treatment recommendations as follows: - Previously discussed orthopedic spine evaluation for spondylolisthesis/stenosis; patient remains uninterested - Obtain MRI lumbar spine; evaluate for progression - Follow up for MRI imaging review  We have discussed the risks, benefits, and alternatives NSAID therapy including but not limited to the risk of bleeding, thrombosis, gastric mucosal irritation/ulceration, allergic reaction and kidney dysfunction; the patient verbalizes an understanding.

## 2024-09-09 NOTE — PHYSICAL EXAM
[de-identified] : Constitutional:  - No acute distress  - Well developed; well nourished   Neurological:  - normal mood and affect  - alert and oriented x 3    Cardiovascular:  - grossly normal  Lumbar Spine Exam:   Inspection:  erythema (-)  ecchymosis (-)  rashes (-)  alignment: no scoliosis  Palpation:   paraspinal tenderness:                  L (-) ; R (-)  thoracic paraspinal tenderness:    L (-) ; R (-)  sciatic nerve tenderness :             L (-) ; R (-)  SI joint tenderness:                        L (-) ; R (-)  GTB tenderness:                            L (-);  R (-)   ROM: Full ROM with mild stiffness  Pain with extremes of extension  Strength:                   Right       Left     Hip Flexion:                (5/5)       (5/5)  Quadriceps:               (5/5)       (5/5)  Hamstrings:                (5/5)       (5/5)  Ankle Dorsiflexion:     (5/5)       (5/5)  EHL:                            (5/5)       (5/5)  Ankle Plantarflexion:  (5/5)       (5/5)  Special Tests:  SLR:                      R (=) ; L (=)  Facet loading:       R (+) ; L (+)  DESTINEY test:          R (n/a) ; L (n/a) Tight Hamstrings   R (+) ; L (+)   Neurologic:  Light touch intact throughout LE  Reflexes normal and symmetric  Gait:  mildly antalgic gait

## 2024-10-09 ENCOUNTER — RESULT REVIEW (OUTPATIENT)
Age: 69
End: 2024-10-09

## 2024-10-26 ENCOUNTER — OFFICE (OUTPATIENT)
Dept: URBAN - METROPOLITAN AREA CLINIC 12 | Facility: CLINIC | Age: 69
Setting detail: OPHTHALMOLOGY
End: 2024-10-26
Payer: MEDICARE

## 2024-10-26 DIAGNOSIS — H25.13: ICD-10-CM

## 2024-10-26 DIAGNOSIS — H43.393: ICD-10-CM

## 2024-10-26 PROBLEM — H16.223 DRY EYE SYNDROME K SICCA; BOTH EYES: Status: ACTIVE | Noted: 2024-10-26

## 2024-10-26 PROCEDURE — 92004 COMPRE OPH EXAM NEW PT 1/>: CPT | Performed by: OPTOMETRIST

## 2024-10-26 ASSESSMENT — REFRACTION_CURRENTRX
OD_AXIS: 120
OD_OVR_VA: 20/
OS_OVR_VA: 20/
OD_SPHERE: -0.75
OS_VPRISM_DIRECTION: SV
OS_AXIS: 60
OS_CYLINDER: -1.00
OD_VPRISM_DIRECTION: SV
OS_SPHERE: -0.75
OD_CYLINDER: -1.00

## 2024-10-26 ASSESSMENT — REFRACTION_MANIFEST
OS_AXIS: 60
OD_CYLINDER: -0.75
OD_SPHERE: +0.25
OD_ADD: +2.50
OD_VA1: 20/20
OS_VA1: 20/20
OD_SPHERE: +0.25
OS_CYLINDER: -0.75
OS_VA1: 20/20
OS_SPHERE: PLANO
OD_AXIS: 110
OS_SPHERE: PLANO
OS_CYLINDER: -0.75
OD_CYLINDER: -0.75
OD_VA1: 20/20
OD_AXIS: 110
OS_AXIS: 065
OS_ADD: +2.50

## 2024-10-26 ASSESSMENT — KERATOMETRY
OS_K1POWER_DIOPTERS: 39.50
METHOD_AUTO_MANUAL: AUTO
OD_K2POWER_DIOPTERS: 40.00
OS_K2POWER_DIOPTERS: 40.25
OD_AXISANGLE_DEGREES: 036
OD_K1POWER_DIOPTERS: 39.25
OS_AXISANGLE_DEGREES: 157

## 2024-10-26 ASSESSMENT — REFRACTION_AUTOREFRACTION
OD_AXIS: 107
OD_SPHERE: -0.50
OS_CYLINDER: -1.00
OS_SPHERE: +0.50
OS_AXIS: 073
OD_CYLINDER: -0.75

## 2024-10-26 ASSESSMENT — VISUAL ACUITY
OS_BCVA: 20/20-2
OD_BCVA: 20/20-2

## 2024-10-26 ASSESSMENT — SUPERFICIAL PUNCTATE KERATITIS (SPK)
OS_SPK: 2+
OD_SPK: 2+

## 2024-10-26 ASSESSMENT — CONFRONTATIONAL VISUAL FIELD TEST (CVF)
OS_FINDINGS: FULL
OD_FINDINGS: FULL

## 2024-10-26 ASSESSMENT — TONOMETRY
OS_IOP_MMHG: 13
OD_IOP_MMHG: 14

## 2024-11-06 ENCOUNTER — APPOINTMENT (OUTPATIENT)
Dept: PAIN MANAGEMENT | Facility: CLINIC | Age: 69
End: 2024-11-06
Payer: MEDICARE

## 2024-11-06 DIAGNOSIS — M54.16 RADICULOPATHY, LUMBAR REGION: ICD-10-CM

## 2024-11-06 PROCEDURE — 62323 NJX INTERLAMINAR LMBR/SAC: CPT

## 2024-11-19 ENCOUNTER — APPOINTMENT (OUTPATIENT)
Dept: PAIN MANAGEMENT | Facility: CLINIC | Age: 69
End: 2024-11-19

## 2024-11-27 ENCOUNTER — APPOINTMENT (OUTPATIENT)
Dept: PAIN MANAGEMENT | Facility: CLINIC | Age: 69
End: 2024-11-27

## 2024-12-09 ENCOUNTER — APPOINTMENT (OUTPATIENT)
Dept: PAIN MANAGEMENT | Facility: CLINIC | Age: 69
End: 2024-12-09

## 2025-02-25 ENCOUNTER — APPOINTMENT (OUTPATIENT)
Dept: PAIN MANAGEMENT | Facility: CLINIC | Age: 70
End: 2025-02-25
Payer: MEDICARE

## 2025-02-25 VITALS — WEIGHT: 234 LBS | BODY MASS INDEX: 30.03 KG/M2 | HEIGHT: 74 IN

## 2025-02-25 DIAGNOSIS — M47.816 SPONDYLOSIS W/OUT MYELOPATHY OR RADICULOPATHY, LUMBAR REGION: ICD-10-CM

## 2025-02-25 DIAGNOSIS — M54.16 RADICULOPATHY, LUMBAR REGION: ICD-10-CM

## 2025-02-25 DIAGNOSIS — M48.061 SPINAL STENOSIS, LUMBAR REGION WITHOUT NEUROGENIC CLAUDICATION: ICD-10-CM

## 2025-02-25 PROCEDURE — 99214 OFFICE O/P EST MOD 30 MIN: CPT

## 2025-03-19 ENCOUNTER — APPOINTMENT (OUTPATIENT)
Dept: PAIN MANAGEMENT | Facility: CLINIC | Age: 70
End: 2025-03-19
Payer: MEDICARE

## 2025-03-19 DIAGNOSIS — M54.16 RADICULOPATHY, LUMBAR REGION: ICD-10-CM

## 2025-03-19 PROCEDURE — 62323 NJX INTERLAMINAR LMBR/SAC: CPT

## 2025-04-22 ENCOUNTER — APPOINTMENT (OUTPATIENT)
Dept: PAIN MANAGEMENT | Facility: CLINIC | Age: 70
End: 2025-04-22
Payer: MEDICARE

## 2025-04-22 VITALS — WEIGHT: 235 LBS | HEIGHT: 74 IN | BODY MASS INDEX: 30.16 KG/M2

## 2025-04-22 DIAGNOSIS — M48.061 SPINAL STENOSIS, LUMBAR REGION WITHOUT NEUROGENIC CLAUDICATION: ICD-10-CM

## 2025-04-22 DIAGNOSIS — M47.816 SPONDYLOSIS W/OUT MYELOPATHY OR RADICULOPATHY, LUMBAR REGION: ICD-10-CM

## 2025-04-22 DIAGNOSIS — M54.16 RADICULOPATHY, LUMBAR REGION: ICD-10-CM

## 2025-04-22 PROCEDURE — 99213 OFFICE O/P EST LOW 20 MIN: CPT

## 2025-06-02 ENCOUNTER — APPOINTMENT (OUTPATIENT)
Dept: ORTHOPEDIC SURGERY | Facility: CLINIC | Age: 70
End: 2025-06-02
Payer: MEDICARE

## 2025-06-02 DIAGNOSIS — M18.12 UNILATERAL PRIMARY OSTEOARTHRITIS OF FIRST CARPOMETACARPAL JOINT, LEFT HAND: ICD-10-CM

## 2025-06-02 DIAGNOSIS — G56.01 CARPAL TUNNEL SYNDROME, RIGHT UPPER LIMB: ICD-10-CM

## 2025-06-02 PROCEDURE — 99213 OFFICE O/P EST LOW 20 MIN: CPT | Mod: 25

## 2025-06-02 PROCEDURE — 20605 DRAIN/INJ JOINT/BURSA W/O US: CPT | Mod: RT

## 2025-06-02 PROCEDURE — L3908: CPT | Mod: RT

## 2025-06-30 ENCOUNTER — APPOINTMENT (OUTPATIENT)
Dept: ORTHOPEDIC SURGERY | Facility: CLINIC | Age: 70
End: 2025-06-30
Payer: MEDICARE

## 2025-06-30 VITALS — BODY MASS INDEX: 28.88 KG/M2 | HEIGHT: 74 IN | WEIGHT: 225 LBS

## 2025-06-30 PROCEDURE — 99213 OFFICE O/P EST LOW 20 MIN: CPT

## 2025-07-21 ENCOUNTER — APPOINTMENT (OUTPATIENT)
Dept: ORTHOPEDIC SURGERY | Facility: CLINIC | Age: 70
End: 2025-07-21
Payer: MEDICARE

## 2025-07-21 VITALS — BODY MASS INDEX: 28.88 KG/M2 | HEIGHT: 74 IN | WEIGHT: 225 LBS

## 2025-07-21 DIAGNOSIS — M18.12 UNILATERAL PRIMARY OSTEOARTHRITIS OF FIRST CARPOMETACARPAL JOINT, LEFT HAND: ICD-10-CM

## 2025-07-21 DIAGNOSIS — G56.01 CARPAL TUNNEL SYNDROME, RIGHT UPPER LIMB: ICD-10-CM

## 2025-07-21 DIAGNOSIS — G56.32 LESION OF RADIAL NERVE, LEFT UPPER LIMB: ICD-10-CM

## 2025-07-21 PROCEDURE — 99213 OFFICE O/P EST LOW 20 MIN: CPT
